# Patient Record
Sex: MALE | Race: WHITE | NOT HISPANIC OR LATINO | Employment: FULL TIME | URBAN - METROPOLITAN AREA
[De-identification: names, ages, dates, MRNs, and addresses within clinical notes are randomized per-mention and may not be internally consistent; named-entity substitution may affect disease eponyms.]

---

## 2017-02-19 ENCOUNTER — HOSPITAL ENCOUNTER (EMERGENCY)
Facility: HOSPITAL | Age: 32
Discharge: HOME | End: 2017-02-19
Attending: EMERGENCY MEDICINE | Admitting: EMERGENCY MEDICINE
Payer: COMMERCIAL

## 2017-02-19 VITALS
RESPIRATION RATE: 16 BRPM | WEIGHT: 160 LBS | OXYGEN SATURATION: 99 % | HEIGHT: 71 IN | TEMPERATURE: 96.9 F | BODY MASS INDEX: 22.4 KG/M2 | DIASTOLIC BLOOD PRESSURE: 78 MMHG | HEART RATE: 67 BPM | SYSTOLIC BLOOD PRESSURE: 132 MMHG

## 2017-02-19 DIAGNOSIS — S05.01XA RIGHT CORNEAL ABRASION, INITIAL ENCOUNTER: Primary | ICD-10-CM

## 2017-02-19 PROCEDURE — 99283 EMERGENCY DEPT VISIT LOW MDM: CPT

## 2017-02-19 RX ORDER — PROPARACAINE HYDROCHLORIDE 5 MG/ML
1 SOLUTION/ DROPS OPHTHALMIC ONCE
Status: COMPLETED | OUTPATIENT
Start: 2017-02-19 | End: 2017-02-19

## 2017-02-19 RX ORDER — ERYTHROMYCIN 5 MG/G
0.5 OINTMENT OPHTHALMIC ONCE
Status: COMPLETED | OUTPATIENT
Start: 2017-02-19 | End: 2017-02-19

## 2017-02-19 RX ORDER — IBUPROFEN 600 MG/1
600 TABLET ORAL ONCE
Status: COMPLETED | OUTPATIENT
Start: 2017-02-19 | End: 2017-02-19

## 2017-02-19 RX ADMIN — FLUORESCEIN SODIUM 1 STRIP: 1 STRIP OPHTHALMIC at 19:00

## 2017-02-19 RX ADMIN — IBUPROFEN 600 MG: 600 TABLET ORAL at 19:23

## 2017-02-19 RX ADMIN — ERYTHROMYCIN 0.5 INCH: 5 OINTMENT OPHTHALMIC at 19:24

## 2017-02-19 RX ADMIN — PROPARACAINE HYDROCHLORIDE 1 DROP: 5 SOLUTION/ DROPS OPHTHALMIC at 19:00

## 2021-06-12 ENCOUNTER — APPOINTMENT (EMERGENCY)
Dept: RADIOLOGY | Facility: HOSPITAL | Age: 36
End: 2021-06-12
Payer: OTHER MISCELLANEOUS

## 2021-06-12 ENCOUNTER — OCCMED (OUTPATIENT)
Dept: URGENT CARE | Age: 36
End: 2021-06-12
Payer: OTHER MISCELLANEOUS

## 2021-06-12 ENCOUNTER — HOSPITAL ENCOUNTER (EMERGENCY)
Facility: HOSPITAL | Age: 36
Discharge: HOME | End: 2021-06-12
Attending: EMERGENCY MEDICINE | Admitting: EMERGENCY MEDICINE
Payer: OTHER MISCELLANEOUS

## 2021-06-12 VITALS
OXYGEN SATURATION: 98 % | DIASTOLIC BLOOD PRESSURE: 87 MMHG | TEMPERATURE: 98 F | HEART RATE: 76 BPM | RESPIRATION RATE: 18 BRPM | SYSTOLIC BLOOD PRESSURE: 142 MMHG

## 2021-06-12 DIAGNOSIS — S01.01XA LACERATION OF SCALP, INITIAL ENCOUNTER: ICD-10-CM

## 2021-06-12 DIAGNOSIS — M54.2 CERVICAL SPINE PAIN: ICD-10-CM

## 2021-06-12 DIAGNOSIS — S09.90XA CLOSED HEAD INJURY, INITIAL ENCOUNTER: ICD-10-CM

## 2021-06-12 DIAGNOSIS — S01.01XA SCALP LACERATION, INITIAL ENCOUNTER: Primary | ICD-10-CM

## 2021-06-12 DIAGNOSIS — M54.2 NECK PAIN: Primary | ICD-10-CM

## 2021-06-12 PROCEDURE — 90715 TDAP VACCINE 7 YRS/> IM: CPT | Performed by: EMERGENCY MEDICINE

## 2021-06-12 PROCEDURE — 99283 EMERGENCY DEPT VISIT LOW MDM: CPT

## 2021-06-12 PROCEDURE — 72125 CT NECK SPINE W/O DYE: CPT

## 2021-06-12 PROCEDURE — G0384 LEV 5 HOSP TYPE B ED VISIT: HCPCS | Performed by: NURSE PRACTITIONER

## 2021-06-12 PROCEDURE — 99284 EMERGENCY DEPT VISIT MOD MDM: CPT | Performed by: EMERGENCY MEDICINE

## 2021-06-12 PROCEDURE — 99285 EMERGENCY DEPT VISIT HI MDM: CPT | Performed by: NURSE PRACTITIONER

## 2021-06-12 PROCEDURE — 90471 IMMUNIZATION ADMIN: CPT

## 2021-06-12 PROCEDURE — 70450 CT HEAD/BRAIN W/O DYE: CPT

## 2021-06-12 RX ORDER — NAPROXEN 500 MG/1
500 TABLET ORAL 2 TIMES DAILY WITH MEALS
Qty: 30 TABLET | Refills: 0 | Status: SHIPPED | OUTPATIENT
Start: 2021-06-12 | End: 2021-09-13

## 2021-06-12 RX ORDER — LIDOCAINE 50 MG/G
1 PATCH TOPICAL DAILY
Qty: 15 PATCH | Refills: 0 | Status: SHIPPED | OUTPATIENT
Start: 2021-06-12 | End: 2021-09-13

## 2021-06-12 RX ORDER — LIDOCAINE 50 MG/G
1 PATCH TOPICAL ONCE
Status: DISCONTINUED | OUTPATIENT
Start: 2021-06-12 | End: 2021-06-12

## 2021-06-12 RX ORDER — KETOROLAC TROMETHAMINE 30 MG/ML
15 INJECTION, SOLUTION INTRAMUSCULAR; INTRAVENOUS ONCE
Status: DISCONTINUED | OUTPATIENT
Start: 2021-06-12 | End: 2021-06-12

## 2021-06-12 RX ADMIN — TETANUS TOXOID, REDUCED DIPHTHERIA TOXOID AND ACELLULAR PERTUSSIS VACCINE, ADSORBED 0.5 ML: 5; 2.5; 8; 8; 2.5 SUSPENSION INTRAMUSCULAR at 10:56

## 2021-06-12 NOTE — ED PROVIDER NOTES
History  Chief Complaint   Patient presents with    Head Laceration     HPI  Patient is a 31-year-old male no significant past medical history presenting for evaluation after head trauma  Patient states that about 90 minutes prior to arrival in the emergency department he was attempting to install a gate, estimates that this weighs approximately 40 lb, lost control of it and it struck him in the head  Patient states that he was momentarily stunned but did not lose consciousness  Patient complains of burning pain in the area of the occiput foot where it struck him and where he has a small laceration  Patient additionally complaining of midline neck pain in the middle of the cervical spine  Patient denies focal weakness or numbness in his arms, legs, confusion, headache other than the superficial burning pain previously noted, nausea, vomiting, return vision changes  Patient does not know when he had his last tetanus shot  None       History reviewed  No pertinent past medical history  Past Surgical History:   Procedure Laterality Date    APPENDECTOMY      ROTATOR CUFF REPAIR         History reviewed  No pertinent family history  I have reviewed and agree with the history as documented  E-Cigarette/Vaping     E-Cigarette/Vaping Substances     Social History     Tobacco Use    Smoking status: Current Every Day Smoker     Packs/day: 1 00     Types: Cigarettes    Smokeless tobacco: Never Used   Substance Use Topics    Alcohol use: No    Drug use: No        Review of Systems   Constitutional: Negative for chills, fatigue and fever  HENT: Negative for congestion and rhinorrhea  Eyes: Negative for photophobia and visual disturbance  Respiratory: Negative for chest tightness and shortness of breath  Cardiovascular: Negative for chest pain, palpitations and leg swelling  Gastrointestinal: Negative for abdominal distention, abdominal pain, diarrhea, nausea and vomiting     Musculoskeletal: Positive for neck pain and neck stiffness  Negative for arthralgias, back pain, gait problem and myalgias  Skin: Negative for color change, pallor, rash and wound  Neurological: Positive for headaches  Negative for weakness and numbness  Psychiatric/Behavioral: Negative for confusion  All other systems reviewed and are negative  Physical Exam  ED Triage Vitals [06/12/21 1046]   Temperature Pulse Respirations Blood Pressure SpO2   98 °F (36 7 °C) 76 18 142/87 98 %      Temp Source Heart Rate Source Patient Position - Orthostatic VS BP Location FiO2 (%)   Oral Monitor -- -- --      Pain Score       --             Orthostatic Vital Signs  Vitals:    06/12/21 1046   BP: 142/87   Pulse: 76       Physical Exam  Vitals and nursing note reviewed  Constitutional:       General: He is not in acute distress  Appearance: He is well-developed  He is not diaphoretic  Comments: Well-appearing, nondistressed   HENT:      Head: Normocephalic and atraumatic  Comments: Roughly     Right Ear: External ear normal       Left Ear: External ear normal       Nose: Nose normal       Mouth/Throat:      Pharynx: No oropharyngeal exudate  Eyes:      Conjunctiva/sclera: Conjunctivae normal       Pupils: Pupils are equal, round, and reactive to light  Cardiovascular:      Rate and Rhythm: Normal rate and regular rhythm  Heart sounds: Normal heart sounds  No murmur heard  No friction rub  No gallop  Pulmonary:      Effort: Pulmonary effort is normal  No respiratory distress  Breath sounds: Normal breath sounds  No wheezing  Chest:      Chest wall: No tenderness  Abdominal:      General: Bowel sounds are normal  There is no distension  Palpations: Abdomen is soft  There is no mass  Tenderness: There is no abdominal tenderness  There is no guarding or rebound  Musculoskeletal:         General: No deformity  Comments: Patient in cervical collar   Point tenderness around C5 without stepoff or deformity  Skin:     General: Skin is warm and dry  Capillary Refill: Capillary refill takes less than 2 seconds  Comments: Small scalp abrasion not actively bleeding not requring closure    Neurological:      Mental Status: He is alert and oriented to person, place, and time  Comments: CN 2-12 intact, full strength and sensation bilaterally in upper and lower extremities   Psychiatric:         Behavior: Behavior normal          ED Medications  Medications   tetanus-diphtheria-acellular pertussis (BOOSTRIX) IM injection 0 5 mL (0 5 mL Intramuscular Given 6/12/21 1056)       Diagnostic Studies  Results Reviewed     None                 CT head without contrast   Final Result by Cheyenne Aaron DO (06/12 1212)   No acute intracranial abnormality  Workstation performed: [id number]         CT spine cervical without contrast   Final Result by Cheyenne Aaron DO (06/12 1214)   Degenerative changes cervical spine, most pronounced C5-C6  No acute cervical spine fracture or traumatic malalignment  Workstation performed: [id number]               Procedures  Procedures      ED Course                                       MDM  Number of Diagnoses or Management Options  Closed head injury, initial encounter  Laceration of scalp, initial encounter  Neck pain  Diagnosis management comments: 37 M with neck pain and small scalp laceration after axial loading injury, non-focal neuro exam, tetanus updated, CT head and C spine demonstrating degenerative changes at C5 but no acute fracture or dislocation, cervical collar cleared, abrasion not requiring closure, discharged with verbal and written return precautions        Disposition  Final diagnoses:   Neck pain   Laceration of scalp, initial encounter   Closed head injury, initial encounter     Time reflects when diagnosis was documented in both MDM as applicable and the Disposition within this note     Time User Action Codes Description Comment    6/12/2021 12:18 PM Ethel Shipman Add [M54 2] Neck pain     6/12/2021 12:21 PM Ethel Shipman Add [S01 01XA] Laceration of scalp, initial encounter     6/12/2021 12:24 PM Ethel Shipman Add [S09 90XA] Closed head injury, initial encounter       ED Disposition     ED Disposition Condition Date/Time Comment    Discharge Stable Sat Jun 12, 2021 12:21 PM Robert Calzada discharge to home/self care  Follow-up Information     Follow up With Specialties Details Why Contact Info Additional [address]  [facility]   [address] [phone]  [facility] [address] [facility]          Discharge Medication List as of 6/12/2021 12:26 PM      START taking these medications    Details   lidocaine (LIDODERM) 5 % Apply 1 patch topically daily Remove & Discard patch within 12 hours or as directed by MD, Starting Sat 6/12/2021, Print      naproxen (NAPROSYN) 500 mg tablet Take 1 tablet (500 mg total) by mouth 2 (two) times a day with meals for 15 days, Starting Sat 6/12/2021, Until Sun 6/27/2021, Print           No discharge procedures on file  PDMP Review     None           ED Provider  Attending physically available and evaluated Robert Calzada I managed the patient along with the ED Attending      Electronically Signed by         Bibi Ratliff MD  06/13/21 8739

## 2021-06-12 NOTE — ED ATTENDING ATTESTATION
Elisa Sahu DO, saw and evaluated the patient  I have discussed the patient with the resident/non-physician practitioner and agree with the resident's/non-physician practitioner's findings, Plan of Care, and MDM as documented in the resident's/non-physician practitioner's note, except where noted  All available labs and Radiology studies were reviewed  At this point I agree with the current assessment done in the Emergency Department  I have conducted an independent evaluation of this patient including a focused history and a physical exam     ED Note - Tuan Mann 37 y o  male MRN: [id number]  Unit/Bed#: ED 15 Encounter: [id number]    History of Present Illness   HPI  Tuan Mann is a 37 y o  male who presents for evaluation of closed head injury  Patient said he was installing a 40 pound gait door when it fell on his head and axial loading fashion  Patient complaining of midline neck pain  No focal neurological deficits  Small laceration to the occipital region  No other complaints  REVIEW OF SYSTEMS  See HPI for further details  12 systems reviewed and otherwise negative except as noted  Historical Information     PAST MEDICAL HISTORY  History reviewed  No pertinent past medical history  FAMILY HISTORY  History reviewed  No pertinent family history      SOCIAL HISTORY  Social History     Socioeconomic History    Marital status: Single     Spouse name: None    Number of children: None    Years of education: None    Highest education level: None   Occupational History    None   Social Needs    Financial resource strain: None    Food insecurity     Worry: None     Inability: None    Transportation needs     Medical: None     Non-medical: None   Tobacco Use    Smoking status: Current Every Day Smoker     Packs/day: 1 00     Types: Cigarettes    Smokeless tobacco: Never Used   Substance and Sexual Activity    Alcohol use: No    Drug use: No    Sexual activity: None Lifestyle    Physical activity     Days per week: None     Minutes per session: None    Stress: None   Relationships    Social connections     Talks on phone: None     Gets together: None     Attends Religion service: None     Active member of club or organization: None     Attends meetings of clubs or organizations: None     Relationship status: None    Intimate partner violence     Fear of current or ex partner: None     Emotionally abused: None     Physically abused: None     Forced sexual activity: None   Other Topics Concern    None   Social History Narrative    None       SURGICAL HISTORY  Past Surgical History:   Procedure Laterality Date    APPENDECTOMY      ROTATOR CUFF REPAIR       Meds/Allergies     CURRENT MEDICATIONS    Current Facility-Administered Medications:     tetanus-diphtheria-acellular pertussis (BOOSTRIX) IM injection 0 5 mL, 0 5 mL, Intramuscular, Once, Lyn Cox,     tetanus-diphtheria-acellular pertussis (BOOSTRIX) IM injection 0 5 mL, 0 5 mL, Intramuscular, Once, Krishna Shepherd MD  No current outpatient medications on file  (Not in a hospital admission)      ALLERGIES  Allergies   Allergen Reactions    Amoxicillin Hives    Penicillins Hives     Objective     PHYSICAL EXAM    VITAL SIGNS: Blood pressure 142/87, pulse 76, temperature 98 °F (36 7 °C), temperature source Oral, resp  rate 18, SpO2 98 %      Constitutional:  Appears well developed and well nourished, no acute distress, non-toxic appearance   Eyes:  PERRL, EOMI, conjunctivae pink, sclerae non-icteric    HENT:  Normocephalic, small approximately 1 centimeter laceration noted to the midline occipital region, no rhinorrhea, mucous membranes moist   Neck:  Cervical collar in place, midline tenderness without deformity or step-off   Respiratory:  No respiratory distress, normal breath sounds   Cardiovascular:  Normal rate, normal rhythm    GI:  Soft, no tenderness, non-distended  :  No CVAT, no flank ecchymosis  Musculoskeletal:  No swelling or edema, no tenderness, no deformities  Integument:  Pink, warm, dry, Well hydrated, no rash, no erythema, no bullae   Lymphatic:  No cervical/ tonsillar/ submandibular lymphadenopathy noted   Neurologic:  Awake, Alert & oriented x 3, CN 2-12 intact, no focal neurological deficits, motor function intact  Psychiatric:  Speech and behavior appropriate       ED COURSE and MDM:    Assessment/Plan   Assessment:  Dickson Rowland is a 37 y o  male presents for evaluation of closed head injury, neck pain after 40 pound gait or fell on head  Plan:  CT head, CT cervical spine, symptom management, laceration repair, Tdap booster, disposition as appropriate  CRITICAL CARE TIME:   0      Portions of the record may have been created with voice recognition software  Occasional wrong word or "sound a like" substitutions may have occurred due to the inherent limitations of voice recognition software       ED Provider  Electronically Signed by

## 2021-06-12 NOTE — DISCHARGE INSTRUCTIONS
Continue local wound care  You will need to follow up in 7-10 days for removal of staple  If you have any spreading redness, pain, fevers, chills, pus, or any signs of infection, return to the emergency department  If you develop weakness or numbness in your arms, confusion, if your neck pain becomes severe, return to the emergency department

## 2021-06-21 ENCOUNTER — APPOINTMENT (OUTPATIENT)
Dept: URGENT CARE | Age: 36
End: 2021-06-21
Payer: OTHER MISCELLANEOUS

## 2021-06-21 PROCEDURE — 99213 OFFICE O/P EST LOW 20 MIN: CPT | Performed by: PHYSICIAN ASSISTANT

## 2021-07-22 ENCOUNTER — APPOINTMENT (EMERGENCY)
Dept: RADIOLOGY | Facility: HOSPITAL | Age: 36
End: 2021-07-22
Payer: COMMERCIAL

## 2021-07-22 ENCOUNTER — HOSPITAL ENCOUNTER (EMERGENCY)
Facility: HOSPITAL | Age: 36
Discharge: HOME | End: 2021-07-22
Attending: EMERGENCY MEDICINE
Payer: COMMERCIAL

## 2021-07-22 VITALS
WEIGHT: 185 LBS | OXYGEN SATURATION: 98 % | BODY MASS INDEX: 25.8 KG/M2 | TEMPERATURE: 98 F | RESPIRATION RATE: 18 BRPM | HEART RATE: 84 BPM | SYSTOLIC BLOOD PRESSURE: 139 MMHG | DIASTOLIC BLOOD PRESSURE: 80 MMHG

## 2021-07-22 DIAGNOSIS — S62.339A BOXER'S FRACTURE: Primary | ICD-10-CM

## 2021-07-22 PROCEDURE — 99283 EMERGENCY DEPT VISIT LOW MDM: CPT

## 2021-07-22 PROCEDURE — 73130 X-RAY EXAM OF HAND: CPT

## 2021-07-22 PROCEDURE — 99284 EMERGENCY DEPT VISIT MOD MDM: CPT | Performed by: EMERGENCY MEDICINE

## 2021-07-22 NOTE — ED PROVIDER NOTES
History  Chief Complaint   Patient presents with    Hand Injury     rigth hand inj yesterday, +swelling      HPI  32 yo M presents with R hand injury  He states he was working on his truck when his felipe hit his hand yesterday  He has had pain that has been moderate in addition to swelling of hand  No other injuries  Prior to Admission Medications   Prescriptions Last Dose Informant Patient Reported? Taking?   lidocaine (LIDODERM) 5 %   No No   Sig: Apply 1 patch topically daily Remove & Discard patch within 12 hours or as directed by MD   naproxen (NAPROSYN) 500 mg tablet   No No   Sig: Take 1 tablet (500 mg total) by mouth 2 (two) times a day with meals for 15 days      Facility-Administered Medications: None       No past medical history on file  Past Surgical History:   Procedure Laterality Date    APPENDECTOMY      ROTATOR CUFF REPAIR         No family history on file  I have reviewed and agree with the history as documented  E-Cigarette/Vaping     E-Cigarette/Vaping Substances     Social History     Tobacco Use    Smoking status: Current Every Day Smoker     Packs/day: 1 00     Types: Cigarettes    Smokeless tobacco: Never Used   Substance Use Topics    Alcohol use: No    Drug use: No       Review of Systems   Constitutional: Negative for chills and fever  HENT: Negative for dental problem and ear pain  Eyes: Negative for pain and redness  Respiratory: Negative for cough and shortness of breath  Cardiovascular: Negative for chest pain and palpitations  Gastrointestinal: Negative for abdominal pain and nausea  Endocrine: Negative for polydipsia and polyphagia  Genitourinary: Negative for dysuria and frequency  Musculoskeletal: Positive for arthralgias  Negative for joint swelling  Skin: Negative for color change and rash  Neurological: Negative for dizziness and headaches  Psychiatric/Behavioral: Negative for behavioral problems and confusion     All other systems reviewed and are negative  Physical Exam  Physical Exam  Vitals and nursing note reviewed  Constitutional:       General: He is not in acute distress  HENT:      Head: Normocephalic and atraumatic  Right Ear: External ear normal       Left Ear: External ear normal       Nose: Nose normal    Eyes:      General: No scleral icterus  Cardiovascular:      Rate and Rhythm: Normal rate  Pulmonary:      Effort: Pulmonary effort is normal  No respiratory distress  Abdominal:      General: There is no distension  Musculoskeletal:         General: No deformity  Normal range of motion  Comments: R hand TTP ulnar aspect with edema, normal ROM, normal sensation and strength, radial pulse 2+   Skin:     Findings: No rash  Neurological:      General: No focal deficit present  Mental Status: He is alert        Gait: Gait normal    Psychiatric:         Mood and Affect: Mood normal          Vital Signs  ED Triage Vitals [07/22/21 1505]   Temperature Pulse Respirations Blood Pressure SpO2   98 °F (36 7 °C) 84 18 139/80 98 %      Temp Source Heart Rate Source Patient Position - Orthostatic VS BP Location FiO2 (%)   Temporal Monitor Sitting Left arm --      Pain Score       --           Vitals:    07/22/21 1505   BP: 139/80   Pulse: 84   Patient Position - Orthostatic VS: Sitting         Visual Acuity      ED Medications  Medications - No data to display    Diagnostic Studies  Results Reviewed     None                 XR hand 3+ views RIGHT   ED Interpretation by Mila Castillo MD (07/22 6513)   Boxers fracture                 Procedures  Splint application    Date/Time: 7/22/2021 4:12 PM  Performed by: Mila Castillo MD  Authorized by: Mila Castillo MD   Universal Protocol:  Consent given by: patient  Patient identity confirmed: verbally with patient      Pre-procedure details:     Sensation:  Normal  Procedure details:     Laterality:  Right    Location:  Hand    Hand:  R hand    Splint type:  Ulnar gutter Supplies:  Elastic bandage, Ortho-Glass and cotton padding             ED Course                                           MDM  Patient presents with R hand injury  Found to have boxers fracture on xray per my read  Have splinted and will refer to orthopedics for recheck  Disposition  Final diagnoses:   Boxer's fracture     Time reflects when diagnosis was documented in both MDM as applicable and the Disposition within this note     Time User Action Codes Description Comment    7/22/2021  3:40 PM Maru Davison [P43 062L] Boxer's fracture       ED Disposition     ED Disposition Condition Date/Time Comment    Discharge Stable Thu Jul 22, 2021  3:40 PM Lethea Medici discharge to home/self care  Follow-up Information     Follow up With Specialties Details Why Contact Info Additional [address] [facility] Surgery Call  for orthopedic follow up [address] [phone]  [address] [facility], [address] [address] [place], [place], [address] [facility]          Patient's Medications   Discharge Prescriptions    No medications on file     No discharge procedures on file      PDMP Review     None          ED Provider  Electronically Signed by           Alexa Amado MD  07/22/21 3612

## 2021-07-22 NOTE — Clinical Note
Mikhail Ellis was seen and treated in our emergency department on 7/22/2021  Diagnosis: boxers fracture    Person Alejandra  may return to work on return date  He may return on this date: 07/29/2021         If you have any questions or concerns, please don't hesitate to call        Robson Li MD    ______________________________           _______________          _______________  Hospital Representative                              Date                                Time

## 2021-07-26 ENCOUNTER — APPOINTMENT (OUTPATIENT)
Dept: RADIOLOGY | Facility: MEDICAL CENTER | Age: 36
End: 2021-07-26
Payer: COMMERCIAL

## 2021-07-26 VITALS
HEART RATE: 98 BPM | BODY MASS INDEX: 25.9 KG/M2 | WEIGHT: 185 LBS | DIASTOLIC BLOOD PRESSURE: 73 MMHG | SYSTOLIC BLOOD PRESSURE: 109 MMHG | HEIGHT: 71 IN

## 2021-07-26 DIAGNOSIS — M79.641 RIGHT HAND PAIN: ICD-10-CM

## 2021-07-26 DIAGNOSIS — S62.336A CLOSED DISPLACED FRACTURE OF NECK OF FIFTH METACARPAL BONE OF RIGHT HAND, INITIAL ENCOUNTER: ICD-10-CM

## 2021-07-26 DIAGNOSIS — M79.641 RIGHT HAND PAIN: Primary | ICD-10-CM

## 2021-07-26 PROCEDURE — 26600 TREAT METACARPAL FRACTURE: CPT | Performed by: ORTHOPAEDIC SURGERY

## 2021-07-26 PROCEDURE — 73130 X-RAY EXAM OF HAND: CPT

## 2021-07-26 PROCEDURE — 99204 OFFICE O/P NEW MOD 45 MIN: CPT | Performed by: ORTHOPAEDIC SURGERY

## 2021-07-26 NOTE — PROGRESS NOTES
CHIEF COMPLAINT:  Chief Complaint   Patient presents with    Right Hand - Pain       SUBJECTIVE:  Chad Pederson is a 31y o  year old male who presents right hand pain  Patient's injury occurred on 07/21/2021  He states he was working on his truck when a [place] it is hand  He had swelling and went to the emergency room  He had x-rays which demonstrated a 5th metacarpal neck fracture  He is placed into splint instructed to follow up with Orthopedics  Today he states he has pain over the 5th metacarpal   He denies any numbness or tingling  PAST MEDICAL HISTORY:  History reviewed  No pertinent past medical history  PAST SURGICAL HISTORY:  Past Surgical History:   Procedure Laterality Date    APPENDECTOMY      ROTATOR CUFF REPAIR         FAMILY HISTORY:  History reviewed  No pertinent family history      SOCIAL HISTORY:  Social History     Tobacco Use    Smoking status: Current Every Day Smoker     Packs/day: 1 00     Types: Cigarettes    Smokeless tobacco: Never Used   Substance Use Topics    Alcohol use: No    Drug use: No       MEDICATIONS:    Current Outpatient Medications:     lidocaine (LIDODERM) 5 %, Apply 1 patch topically daily Remove & Discard patch within 12 hours or as directed by MD (Patient not taking: Reported on 7/26/2021), Disp: 15 patch, Rfl: 0    naproxen (NAPROSYN) 500 mg tablet, Take 1 tablet (500 mg total) by mouth 2 (two) times a day with meals for 15 days, Disp: 30 tablet, Rfl: 0    ALLERGIES:  Allergies   Allergen Reactions    Amoxicillin Hives    Penicillins Hives       REVIEW OF SYSTEMS:  Review of Systems  ROS:   General: no fever, no chills  HEENT:  No loss of hearing or eyesight problems  Eyes:  No red eyes  Respiratory:  No coughing, shortness of breath or wheezing  Cardiovascular:  No chest pain, no palpitations  GI:  Abdomen soft nontender, denies nausea  Endocrine:  No muscle weakness, no frequent urination, no excessive thirst  Urinary:  No dysuria, no incontinence  Musculoskeletal: see HPI and PE  SKIN:  No skin rash, no dry skin  Neurological:  No headaches, no confusion  Psychiatric:  No suicide thoughts, no anxiety, no depression  Review of all other systems is negative    VITALS:  Vitals:    07/26/21 1102   BP: 109/73   Pulse: 98       LABS:  HgA1c: No results found for: HGBA1C  BMP: No results found for: GLUCOSE, CALCIUM, NA, K, CO2, CL, BUN, CREATININE    _____________________________________________________  PHYSICAL EXAMINATION:  General: well developed and well nourished, alert, oriented times 3 and appears comfortable  Psychiatric: Normal  HEENT: Trachea Midline, No torticollis  Pulmonary: No audible wheezing or strider  Cardiovascular: No discernable arrhythmia   Skin: No masses, erythema, lacerations, fluctation, ulcerations  Neurovascular: Sensation Intact to the Median, Ulnar, Radial Nerve, Motor Intact to the Median, Ulnar, Radial Nerve and Pulses Intact    MUSCULOSKELETAL EXAMINATION:  Right hand  Swelling and ecchymosis noted over the 5th metacarpal, no erythema noted   Tenderness to palpation over the 5th metacarpal   He has decreased range of motion due to pain and swelling    ___________________________________________________  STUDIES REVIEWED:  Images obtained today of the Right hand 3 views demonstrate Fracture of the 5th metacarpal neck noted      PROCEDURES PERFORMED:  Fracture / Dislocation Treatment    Date/Time: 7/26/2021 11:30 AM  Performed by: Clifton Monge MD  Authorized by: Clifton Monge MD     Patient Location:  Clinic  Verbal consent obtained?: Yes    Risks and benefits: Risks, benefits and alternatives were discussed    Consent given by:  Patient  Patient states understanding of procedure being performed: Yes    Patient's understanding of procedure matches consent: Yes    Site marked: Yes    Patient identity confirmed:  Verbally with patient  Injury location:  Hand  Location details:  Right hand  Injury type: Fracture  Fracture type: fifth metacarpal    Neurovascular status: Neurovascularly intact    Distal perfusion: normal    Neurological function: normal    Range of motion: normal    Local anesthesia used?: No    Immobilization:  Cast  Cast type: ulnar gutter   Supplies used:  Cotton padding and fiberglass  Neurovascular status: Neurovascularly intact    Distal perfusion: normal    Neurological function: normal    Range of motion: normal    Patient tolerance:  Patient tolerated the procedure well with no immediate complications           _____________________________________________________  ASSESSMENT/PLAN:    Right 5th metacarpal neck fracture  - patient was placed into a ulnar gutter cast today  He tolerated well     -he was given cast precautions   -he is given a work note for no use of the right upper extremity  -he will follow up in 3 weeks for re-evaluation with x-rays of the right hand and cast off  Follow Up:  Return in about 3 weeks (around 8/16/2021)  Work/school status:  No use of the right upper extremity    To Do Next Visit:  Re-evaluation of current issue, cast off x-rays right hand    General Discussions:  Fracture - Nonoperative Care: The physiology of a fractured bone was discussed with the patient today  With non-displaced or minimally displaced fractures, conservative treatment such as casting or splinting often results in a functional recovery  Typically, these fractures are immobilized in either a cast or splint depending on the pattern  Radiographs are typically taken at intervals throughout the fracture healing to ensure that reduction or alignment is not lost   If the fracture loses its alignment, surgical intervention may be required to stabilize it  Medical conditions such as diabetes, osteoporosis, vitamin D deficiency, and a history of or exposure to smoking may delay or prevent fracture healing   Options between cast/splint immobilization and surgical treatment were offered and the risks and benefits of both were discussed  Scribe Attestation    I,:  Brooklyn Roblero PA-C am acting as a scribe while in the presence of the attending physician :       I,:  Raegan Feliciano MD personally performed the services described in this documentation    as scribed in my presence :           Portions of the record may have been created with voice recognition software  Occasional wrong word or "sound a like" substitutions may have occurred due to the inherent limitations of voice recognition software  Read the chart carefully and recognize, using context, where substitutions have occurred

## 2021-08-07 ENCOUNTER — HOSPITAL ENCOUNTER (EMERGENCY)
Facility: HOSPITAL | Age: 36
Discharge: HOME | End: 2021-08-07
Attending: EMERGENCY MEDICINE | Admitting: EMERGENCY MEDICINE
Payer: COMMERCIAL

## 2021-08-07 ENCOUNTER — APPOINTMENT (EMERGENCY)
Dept: RADIOLOGY | Facility: HOSPITAL | Age: 36
End: 2021-08-07
Payer: COMMERCIAL

## 2021-08-07 VITALS
TEMPERATURE: 98.1 F | WEIGHT: 185 LBS | HEART RATE: 91 BPM | HEIGHT: 71 IN | OXYGEN SATURATION: 99 % | DIASTOLIC BLOOD PRESSURE: 87 MMHG | BODY MASS INDEX: 25.9 KG/M2 | SYSTOLIC BLOOD PRESSURE: 134 MMHG | RESPIRATION RATE: 18 BRPM

## 2021-08-07 DIAGNOSIS — S61.209A AVULSION OF FINGER, INITIAL ENCOUNTER: Primary | ICD-10-CM

## 2021-08-07 PROCEDURE — 99283 EMERGENCY DEPT VISIT LOW MDM: CPT

## 2021-08-07 PROCEDURE — 99282 EMERGENCY DEPT VISIT SF MDM: CPT | Performed by: EMERGENCY MEDICINE

## 2021-08-07 PROCEDURE — 73140 X-RAY EXAM OF FINGER(S): CPT

## 2021-08-07 RX ORDER — LIDOCAINE HYDROCHLORIDE AND EPINEPHRINE 20; 5 MG/ML; UG/ML
5 INJECTION, SOLUTION EPIDURAL; INFILTRATION; INTRACAUDAL; PERINEURAL ONCE
Status: COMPLETED | OUTPATIENT
Start: 2021-08-07 | End: 2021-08-07

## 2021-08-07 RX ORDER — OXYCODONE HYDROCHLORIDE 5 MG/1
5 TABLET ORAL ONCE
Status: COMPLETED | OUTPATIENT
Start: 2021-08-07 | End: 2021-08-07

## 2021-08-07 RX ORDER — LIDOCAINE HYDROCHLORIDE 20 MG/ML
5 INJECTION, SOLUTION EPIDURAL; INFILTRATION; INTRACAUDAL; PERINEURAL ONCE
Status: DISCONTINUED | OUTPATIENT
Start: 2021-08-07 | End: 2021-08-07

## 2021-08-07 RX ADMIN — LIDOCAINE HYDROCHLORIDE AND EPINEPHRINE 5 ML: 20; 5 INJECTION, SOLUTION EPIDURAL; INFILTRATION; INTRACAUDAL; PERINEURAL at 22:45

## 2021-08-07 RX ADMIN — OXYCODONE HYDROCHLORIDE 5 MG: 5 TABLET ORAL at 22:39

## 2021-08-08 NOTE — ED PROVIDER NOTES
Pt Name: Raghu Mercado  MRN: [id number]  [place] 1985  Age/Sex: 31 y o  male  Date of evaluation: 8/7/2021  PCP: No primary care provider on file  CHIEF COMPLAINT    Chief Complaint   Patient presents with    Finger Injury     Pt reports cutting his right ring finger on a mandaline  HPI    31 y o  male presenting with finger injury  Patient has a right forearm/hand cast on for boxer's fracture  He was cutting a cucumber with a mandoline and accidentally sliced his tip of 4th digit on right hand  He is right-handed  Up-to-date with tetanus  Injury occurred 45 minutes prior to my examination  Past Medical and Surgical History    History reviewed  No pertinent past medical history  Past Surgical History:   Procedure Laterality Date    APPENDECTOMY      ROTATOR CUFF REPAIR         History reviewed  No pertinent family history  Social History     Tobacco Use    Smoking status: Current Every Day Smoker     Packs/day: 1 00     Types: Cigarettes    Smokeless tobacco: Never Used   Substance Use Topics    Alcohol use: No    Drug use: No           Allergies    Allergies   Allergen Reactions    Amoxicillin Hives    Penicillins Hives       Home Medications    Prior to Admission medications    Medication Sig Start Date End Date Taking? Authorizing Provider   lidocaine (LIDODERM) 5 % Apply 1 patch topically daily Remove & Discard patch within 12 hours or as directed by MD  Patient not taking: Reported on 7/26/2021 6/12/21   Mary Lou Finney MD   naproxen (NAPROSYN) 500 mg tablet Take 1 tablet (500 mg total) by mouth 2 (two) times a day with meals for 15 days 6/12/21 6/27/21  Mary Lou Finney MD           Review of Systems    Review of Systems   Constitutional: Negative for chills and fever  Respiratory: Negative for cough and shortness of breath  Gastrointestinal: Negative for nausea and vomiting  Skin: Positive for wound  Negative for rash     Neurological: Negative for syncope and numbness  Physical Exam      ED Triage Vitals   Temperature Pulse Respirations Blood Pressure SpO2   08/07/21 2026 08/07/21 2026 08/07/21 2026 08/07/21 2026 08/07/21 2026   98 1 °F (36 7 °C) 91 18 134/87 99 %      Temp Source Heart Rate Source Patient Position - Orthostatic VS BP Location FiO2 (%)   08/07/21 2026 08/07/21 2026 08/07/21 2026 08/07/21 2026 --   Oral Monitor Sitting Left arm       Pain Score       08/07/21 2239       Worst Possible Pain               Physical Exam  Constitutional:       Appearance: He is not ill-appearing  HENT:      Head: Normocephalic and atraumatic  Nose: Nose normal       Mouth/Throat:      Mouth: Mucous membranes are moist    Eyes:      Extraocular Movements: Extraocular movements intact  Pupils: Pupils are equal, round, and reactive to light  Cardiovascular:      Rate and Rhythm: Normal rate and regular rhythm  Pulmonary:      Effort: Pulmonary effort is normal  No respiratory distress  Abdominal:      General: There is no distension  Musculoskeletal:      Cervical back: Normal range of motion  No rigidity  Comments: R arm cast in place  Skin:     General: Skin is warm  Comments: R 4th digit distal tip avulsion laceration, no involvement of nail, no visibile bone, no pulsatile bleeding however is oozing blood  Neurological:      Mental Status: He is alert and oriented to person, place, and time  Mental status is at baseline  Sensory: No sensory deficit  Diagnostic Results      Labs:    Results Reviewed     None          All labs reviewed and utilized in the medical decision making process    Radiology:    XR finger fourth digit-ring RIGHT    (Results Pending)       All radiology studies independently viewed by me and interpreted by the radiologist     Procedure    Procedures        MDM    Injected approximately 1 mL of lidocaine with epinephrine to achieve anesthesia, washed under tap water  Performing incremental injection withdrawing back 1st   No blood return  No nail involvement bleeding improved significantly with the lidocaine infiltration wound is not amenable to sutures given it is an avulsion  Discussed this with patient, he verbalized understanding  Placed petroleum jelly gauze, gauze wrap and tape  He sustained the injury with a brand-new mandoline, do not think he needs antibiotic prophylaxis at this time  Currently no concern for infection  He already is following with orthopedic hand surgery, advised to call his hand surgeon on Monday  Return precautions discussed  Medications   oxyCODONE (ROXICODONE) IR tablet 5 mg (5 mg Oral Given 8/7/21 2239)   lidocaine-epinephrine (XYLOCAINE-MPF/EPINEPHRINE) 2 %-1:200,000 injection 5 mL (5 mL Infiltration Given 8/7/21 2245)           FINAL IMPRESSION    Final diagnoses:   Avulsion of finger, initial encounter         DISPOSITION    Time reflects when diagnosis was documented in both MDM as applicable and the Disposition within this note     Time User Action Codes Description Comment    8/7/2021 10:55 PM Joy [email or web address] Avulsion of finger, initial encounter       ED Disposition     ED Disposition Condition Date/Time Comment    Discharge Stable Sat Aug 7, 2021 10:55 PM Precious Eduardo discharge to home/self care              Follow-up Information     Follow up With Specialties Details Why Contact Info    Yadi Caldwell MD Orthopedic Surgery, Hand Surgery, Orthopedics Call on 8/9/2021  [address] [facility] [address]  [facility] [address]  [phone]              PATIENT REFERRED TO:    Yadi Caldwell MD  [address] [facility] [address]  [address]  [phone]    Call on 8/9/2021        DISCHARGE MEDICATIONS:    Discharge Medication List as of 8/7/2021 10:55 PM      CONTINUE these medications which have NOT CHANGED    Details   lidocaine (LIDODERM) 5 % Apply 1 patch topically daily Remove & Discard patch within 12 hours or as directed by MD, Starting Sat 6/12/2021, Print      naproxen (NAPROSYN) 500 mg tablet Take 1 tablet (500 mg total) by mouth 2 (two) times a day with meals for 15 days, Starting Sat 6/12/2021, Until Sun 6/27/2021, Print             No discharge procedures on file  Tran Orozco DO        This note was partially completed using voice recognition technology, and was scanned for gross errors; however some errors may still exist  Please contact the author with any questions or requests for clarification        Tran Orozco DO  08/07/21 [id number]

## 2021-08-16 ENCOUNTER — APPOINTMENT (OUTPATIENT)
Dept: RADIOLOGY | Facility: MEDICAL CENTER | Age: 36
End: 2021-08-16
Payer: COMMERCIAL

## 2021-08-16 ENCOUNTER — OFFICE VISIT (OUTPATIENT)
Dept: OCCUPATIONAL THERAPY | Facility: MEDICAL CENTER | Age: 36
End: 2021-08-16
Payer: COMMERCIAL

## 2021-08-16 ENCOUNTER — OFFICE VISIT (OUTPATIENT)
Dept: OBGYN CLINIC | Facility: MEDICAL CENTER | Age: 36
End: 2021-08-16

## 2021-08-16 VITALS
BODY MASS INDEX: 25.93 KG/M2 | SYSTOLIC BLOOD PRESSURE: 135 MMHG | HEIGHT: 71 IN | HEART RATE: 96 BPM | WEIGHT: 185.2 LBS | DIASTOLIC BLOOD PRESSURE: 84 MMHG

## 2021-08-16 DIAGNOSIS — S62.336D CLOSED DISPLACED FRACTURE OF NECK OF FIFTH METACARPAL BONE OF RIGHT HAND WITH ROUTINE HEALING, SUBSEQUENT ENCOUNTER: ICD-10-CM

## 2021-08-16 DIAGNOSIS — S62.336D CLOSED DISPLACED FRACTURE OF NECK OF FIFTH METACARPAL BONE OF RIGHT HAND WITH ROUTINE HEALING, SUBSEQUENT ENCOUNTER: Primary | ICD-10-CM

## 2021-08-16 DIAGNOSIS — S62.336A CLOSED DISPLACED FRACTURE OF NECK OF FIFTH METACARPAL BONE OF RIGHT HAND, INITIAL ENCOUNTER: ICD-10-CM

## 2021-08-16 DIAGNOSIS — S61.209A AVULSION OF FINGER TIP, INITIAL ENCOUNTER: ICD-10-CM

## 2021-08-16 PROCEDURE — L3808 WHFO, RIGID W/O JOINTS: HCPCS

## 2021-08-16 PROCEDURE — 73130 X-RAY EXAM OF HAND: CPT

## 2021-08-16 PROCEDURE — 99024 POSTOP FOLLOW-UP VISIT: CPT | Performed by: ORTHOPAEDIC SURGERY

## 2021-08-16 NOTE — LETTER
August 16, 2021     Patient: Mannie Davis   YOB: 1985   Date of Visit: 8/16/2021       To Whom it May Concern:    Jay Neal is under my professional care  He was seen in my office on 8/16/2021  He may return to light duty at work  Still no use of the right upper extremity  He will follow-up in 3 weeks for re-evaluation  If you have any questions or concerns, please don't hesitate to call           Sincerely,          Cesia Coleman MD        CC: No Recipients

## 2021-08-16 NOTE — PROGRESS NOTES
CHIEF COMPLAINT:  Chief Complaint   Patient presents with    Right Hand - Follow-up       SUBJECTIVE:  Neva Heimlich is a 31y o  year old male who presents for follow-up of right 5th metacarpal neck fracture sustained on 07/21/2021 while working on his truck when a felipe hit his hand  At his last visit on 07/26/2021 patient was placed into an ulnar gutter cast   Patient has been tolerating the cast well until he unfortunately was cutting a cucumber with a mandoline and accidentally sliced the tip of his ring finger on the casted hand  He was evaluated at the ED same day on 08/07/2021 where the wound was dressed and he was advised to follow up as scheduled with us today  He presents to the office for cast removal repeat x-rays  Patient offers no additional complaints this time  PAST MEDICAL HISTORY:  History reviewed  No pertinent past medical history  PAST SURGICAL HISTORY:  Past Surgical History:   Procedure Laterality Date    APPENDECTOMY      ROTATOR CUFF REPAIR         FAMILY HISTORY:  History reviewed  No pertinent family history  SOCIAL HISTORY:  Social History     Tobacco Use    Smoking status: Current Every Day Smoker     Packs/day: 1 00     Types: Cigarettes    Smokeless tobacco: Never Used   Vaping Use    Vaping Use: Never used   Substance Use Topics    Alcohol use: No    Drug use: No       MEDICATIONS:    Current Outpatient Medications:     lidocaine (LIDODERM) 5 %, Apply 1 patch topically daily Remove & Discard patch within 12 hours or as directed by MD (Patient not taking: Reported on 7/26/2021), Disp: 15 patch, Rfl: 0    naproxen (NAPROSYN) 500 mg tablet, Take 1 tablet (500 mg total) by mouth 2 (two) times a day with meals for 15 days, Disp: 30 tablet, Rfl: 0    ALLERGIES:  Allergies   Allergen Reactions    Amoxicillin Hives    Penicillins Hives       REVIEW OF SYSTEMS:  Review of Systems   Constitutional: Negative for appetite change and unexpected weight change     HENT: Negative for congestion and trouble swallowing  Eyes: Negative for visual disturbance  Respiratory: Negative for cough and shortness of breath  Cardiovascular: Negative for chest pain and palpitations  Gastrointestinal: Negative for nausea and vomiting  Endocrine: Negative for cold intolerance and heat intolerance  Musculoskeletal: Negative for gait problem and myalgias  Skin: Negative for rash  Neurological: Negative for numbness  VITALS:  Vitals:    08/16/21 1350   BP: 135/84   Pulse: 96       LABS:  HgA1c: No results found for: HGBA1C  BMP: No results found for: GLUCOSE, CALCIUM, NA, K, CO2, CL, BUN, CREATININE    _____________________________________________________  PHYSICAL EXAMINATION:  General: well developed and well nourished, alert, oriented times 3 and appears comfortable  Psychiatric: Normal  HEENT: Trachea Midline, No torticollis  Pulmonary: No audible wheezing or strider  Cardiovascular: No discernable arrhythmia   Skin: No Masses, No Erythema  Neurovascular: Sensation Intact to the Median, Ulnar, Radial Nerve, Motor Intact to the Median, Ulnar, Radial Nerve and Pulses Intact    MUSCULOSKELETAL EXAMINATION:  Right hand  Finger tip avulsion injury at the tip of the right finger  No erythema or active drainage   No damage to nail bed   Nontender over 5th metacarpal neck at fracture site  ROM limited secondary to stiffness   Sensation intact to light touch   Brisk capillary refill    ___________________________________________________  STUDIES REVIEWED:  Images obtained today of the right hand 3 views demonstrate maintained alignment of 5th metacarpal neck fracture with signs of interval healing  No acute fracture dislocations of the right ring finger        PROCEDURES PERFORMED:  No Procedures performed today    _____________________________________________________  ASSESSMENT/PLAN:    Right 5th metacarpal neck fracture, now 3 5 weeks out from injury  Finger tip avulsion injury of the right ring finger sustained on 8/7/2021  · Ulnar gutter cast removed today without complication  We will send him to OT for a custom forearm based ulnar gutter splint  He can remove the splint for hygiene and ROM only but should wear it most other times  · His right ring finger avulsion injury should continue to heal nicely over time  · Keep it clean and dry and monitor for any signs of infection  · He can return to light duty at work still with no use of the RUE  · Contact the office with any further questions or concerns prior to next follow-up  · Follow-up in 3 weeks  Follow Up:  No follow-ups on file      To Do Next Visit:  X-rays of the  right  hand    Scribe Attestation    I,:  Heather Beauchamp am acting as a scribe while in the presence of the attending physician :       I,:  Brynn Plata MD personally performed the services described in this documentation    as scribed in my presence :

## 2021-08-16 NOTE — PROGRESS NOTES
Orthosis    Diagnosis:   1  Closed displaced fracture of neck of fifth metacarpal bone of right hand with routine healing, subsequent encounter  Ambulatory referral to PT/OT hand therapy     Indication: Fracture    Location: Right  wrist, ring finger and small finger  Supplies: Custom Fit Orthotic  Orthosis type: Ulnar Gutter Forearm Based  Wearing Schedule: Remove for HEP  Describe Position: forearm based ulnar gutter wrist extended 20* inclusive of digits 4 and 5, MP flexed to patient tolerance and IPs free    Precautions: Fracture    Patient or Caregiver expresses understanding of wearing Schedule and Precautions? Yes  Patient or Caregiver able to don/doff orthotic independently? Yes    Written orders provided to patient? Yes  Orders Obtained: Written  Orders Obtained from: [facility]    Return for evaluation and treatment No    Patient verbalized and demonstrated understanding of education provided on splint wear and care, schedule, donning/doffing, monitoring of skin integrity and circulation, HEP via successful teachback and demonstration  Patient instructed to contact office with questions or concerns

## 2021-09-13 ENCOUNTER — APPOINTMENT (OUTPATIENT)
Dept: RADIOLOGY | Facility: MEDICAL CENTER | Age: 36
End: 2021-09-13
Payer: COMMERCIAL

## 2021-09-13 ENCOUNTER — OFFICE VISIT (OUTPATIENT)
Dept: OBGYN CLINIC | Facility: MEDICAL CENTER | Age: 36
End: 2021-09-13

## 2021-09-13 VITALS
SYSTOLIC BLOOD PRESSURE: 124 MMHG | HEIGHT: 71 IN | HEART RATE: 73 BPM | DIASTOLIC BLOOD PRESSURE: 78 MMHG | BODY MASS INDEX: 25.94 KG/M2 | WEIGHT: 185.3 LBS

## 2021-09-13 DIAGNOSIS — S62.336D CLOSED DISPLACED FRACTURE OF NECK OF FIFTH METACARPAL BONE OF RIGHT HAND WITH ROUTINE HEALING, SUBSEQUENT ENCOUNTER: ICD-10-CM

## 2021-09-13 DIAGNOSIS — S62.336D CLOSED DISPLACED FRACTURE OF NECK OF FIFTH METACARPAL BONE OF RIGHT HAND WITH ROUTINE HEALING, SUBSEQUENT ENCOUNTER: Primary | ICD-10-CM

## 2021-09-13 PROCEDURE — 73130 X-RAY EXAM OF HAND: CPT

## 2021-09-13 PROCEDURE — 99024 POSTOP FOLLOW-UP VISIT: CPT | Performed by: PHYSICIAN ASSISTANT

## 2021-09-13 NOTE — LETTER
September 13, 2021     Patient: Koki Vale   YOB: 1985   Date of Visit: 9/13/2021       To Whom it May Concern:    Arnulfo Valencia is under my professional care  He was seen in my office on 9/13/2021  He can return to work without restrictions  No follow up is needed  If you have any questions or concerns, please don't hesitate to call  Sincerely,          Junior Fannie Bauer PA-C        CC: No Recipients

## 2021-09-14 NOTE — PROGRESS NOTES
CHIEF COMPLAINT:  Chief Complaint   Patient presents with    Right Hand - Follow-up       SUBJECTIVE:  Ly Pop is a 31y o  year old male who presents for follow-up of right 5th metacarpal neck fracture sustained on 07/21/2021 while working on his truck when a felipe hit his hand  Patient states that he has been wearing his ulnar gutter splint while at home  He presents to the office today without his splint  He states that he has no pain or discomfort at this time  He states he has been working on his range of motion at home when coming out of the splint  He denies any numbness or tingling  PAST MEDICAL HISTORY:  History reviewed  No pertinent past medical history  PAST SURGICAL HISTORY:  Past Surgical History:   Procedure Laterality Date    APPENDECTOMY      ROTATOR CUFF REPAIR         FAMILY HISTORY:  History reviewed  No pertinent family history  SOCIAL HISTORY:  Social History     Tobacco Use    Smoking status: Current Every Day Smoker     Packs/day: 1 00     Types: Cigarettes    Smokeless tobacco: Never Used   Vaping Use    Vaping Use: Never used   Substance Use Topics    Alcohol use: No    Drug use: No       MEDICATIONS:  No current outpatient medications on file  ALLERGIES:  Allergies   Allergen Reactions    Amoxicillin Hives    Penicillins Hives       REVIEW OF SYSTEMS:  Review of Systems   Constitutional: Negative for appetite change and unexpected weight change  HENT: Negative for congestion and trouble swallowing  Eyes: Negative for visual disturbance  Respiratory: Negative for cough and shortness of breath  Cardiovascular: Negative for chest pain and palpitations  Gastrointestinal: Negative for nausea and vomiting  Endocrine: Negative for cold intolerance and heat intolerance  Musculoskeletal: Negative for gait problem and myalgias  Skin: Negative for rash  Neurological: Negative for numbness         VITALS:  Vitals:    09/13/21 1333   BP: 124/78 Pulse: 73       LABS:  HgA1c: No results found for: HGBA1C  BMP: No results found for: GLUCOSE, CALCIUM, NA, K, CO2, CL, BUN, CREATININE    _____________________________________________________  PHYSICAL EXAMINATION:  General: well developed and well nourished, alert, oriented times 3 and appears comfortable  Psychiatric: Normal  HEENT: Trachea Midline, No torticollis  Pulmonary: No audible wheezing or strider  Cardiovascular: No discernable arrhythmia   Skin: No Masses, No Erythema  Neurovascular: Sensation Intact to the Median, Ulnar, Radial Nerve, Motor Intact to the Median, Ulnar, Radial Nerve and Pulses Intact    MUSCULOSKELETAL EXAMINATION:  Right hand  No erythema edema or ecchymosis noted, skin is warm to touch   Finger tip is well-healed with no signs or symptoms of infection   No tenderness to palpation over the fracture site  Fracture stable with testing   No pain with axial loading  Patient is able to make a full composite fist    ___________________________________________________  STUDIES REVIEWED:  Images obtained today of the right hand 3 views demonstrate maintained alignment of 5th metacarpal neck fracture with signs of interval healing  No acute fracture dislocations of the right ring finger  PROCEDURES PERFORMED:  No Procedures performed today    _____________________________________________________  ASSESSMENT/PLAN:    Right 5th metacarpal neck fracture  Finger tip avulsion injury of the right ring finger sustained on 8/7/2021  · Patient was advised that he can use his hand as tolerated for his activities of daily living   · He was advised to slowly discontinue the splint at this time   · He was encouraged to work on a home exercise program for range of motion strengthening   · He will follow up with us as needed        Follow Up:  Return if symptoms worsen or fail to improve      To Do Next Visit:  X-rays of the  right  hand

## 2022-07-18 NOTE — LETTER
July 26, 2021     Patient: Precious Eduardo   YOB: 1985   Date of Visit: 7/26/2021       To Whom it May Concern:    Gilda Good is under my professional care  He was seen in my office on 7/26/2021  He will have no use of the right upper extremity  He will follow up in 3 weeks for re-evaluation  If you have any questions or concerns, please don't hesitate to call           Sincerely,          Yadi Caldwell MD        CC: No Recipients
No

## 2023-11-20 ENCOUNTER — OFFICE VISIT (OUTPATIENT)
Dept: URGENT CARE | Facility: CLINIC | Age: 38
End: 2023-11-20
Payer: COMMERCIAL

## 2023-11-20 VITALS
DIASTOLIC BLOOD PRESSURE: 78 MMHG | RESPIRATION RATE: 18 BRPM | WEIGHT: 185 LBS | HEIGHT: 71 IN | BODY MASS INDEX: 25.9 KG/M2 | TEMPERATURE: 98.6 F | HEART RATE: 95 BPM | OXYGEN SATURATION: 100 % | SYSTOLIC BLOOD PRESSURE: 146 MMHG

## 2023-11-20 DIAGNOSIS — M54.50 ACUTE MIDLINE LOW BACK PAIN WITHOUT SCIATICA: Primary | ICD-10-CM

## 2023-11-20 PROCEDURE — 99213 OFFICE O/P EST LOW 20 MIN: CPT | Performed by: PHYSICIAN ASSISTANT

## 2023-11-20 PROCEDURE — 96372 THER/PROPH/DIAG INJ SC/IM: CPT | Performed by: PHYSICIAN ASSISTANT

## 2023-11-20 RX ORDER — CYCLOBENZAPRINE HCL 10 MG
10 TABLET ORAL 3 TIMES DAILY PRN
Qty: 45 TABLET | Refills: 0 | Status: SHIPPED | OUTPATIENT
Start: 2023-11-20

## 2023-11-20 RX ORDER — IBUPROFEN 400 MG/1
TABLET ORAL EVERY 6 HOURS PRN
COMMUNITY

## 2023-11-20 RX ORDER — PREDNISONE 10 MG/1
TABLET ORAL
Qty: 30 TABLET | Refills: 0 | Status: SHIPPED | OUTPATIENT
Start: 2023-11-20 | End: 2023-11-30

## 2023-11-20 RX ORDER — KETOROLAC TROMETHAMINE 30 MG/ML
60 INJECTION, SOLUTION INTRAMUSCULAR; INTRAVENOUS ONCE
Status: COMPLETED | OUTPATIENT
Start: 2023-11-20 | End: 2023-11-20

## 2023-11-20 RX ADMIN — KETOROLAC TROMETHAMINE 60 MG: 30 INJECTION, SOLUTION INTRAMUSCULAR; INTRAVENOUS at 16:13

## 2023-11-20 NOTE — PROGRESS NOTES
[facility]        NAME: Diego Spring is a 41 y.o. male  : 1985    MRN: [id number]  DATE: 2023  TIME: 4:14 PM    Assessment and Plan   Acute midline low back pain without sciatica [M54.50]  1. Acute midline low back pain without sciatica  ketorolac (TORADOL) 60 mg/2 mL IM injection 60 mg    cyclobenzaprine (FLEXERIL) 10 mg tablet    predniSONE 10 mg tablet        Patient Instructions     Take medicine as prescribed  1000mg tylenol [id number]  F/u with spine if sxs worsen or persist  Follow up with PCP in 3-5 days. Proceed to  ER if symptoms worsen. Chief Complaint     Chief Complaint   Patient presents with    Back Pain     Moved box of tile yesterday- felt pop and feels like he is having a spasm in his back if he moves the wrong way; tightness; tried ice/heat at home          History of Present Illness       Back Pain  This is a new problem. The problem occurs constantly. The problem has been gradually worsening since onset. The pain is present in the lumbar spine. The quality of the pain is described as stabbing. The pain does not radiate. The pain is moderate. Pertinent negatives include no bladder incontinence, bowel incontinence, chest pain, fever, paresis, paresthesias or tingling. He has tried ice, NSAIDs, analgesics and heat for the symptoms. Review of Systems   Review of Systems   Constitutional:  Negative for chills, diaphoresis, fatigue and fever. Respiratory:  Negative for cough, chest tightness, shortness of breath and wheezing. Cardiovascular:  Negative for chest pain. Gastrointestinal:  Negative for bowel incontinence. Genitourinary:  Negative for bladder incontinence. Musculoskeletal:  Positive for back pain, gait problem and myalgias. Negative for joint swelling, neck pain and neck stiffness. Skin:  Negative for rash. Neurological:  Negative for tingling and paresthesias.      Current Medications       Current Outpatient Medications: cyclobenzaprine (FLEXERIL) 10 mg tablet, Take 1 tablet (10 mg total) by mouth 3 (three) times a day as needed for muscle spasms, Disp: 45 tablet, Rfl: 0    ibuprofen (MOTRIN) 400 mg tablet, Take by mouth every 6 (six) hours as needed for mild pain, Disp: , Rfl:     predniSONE 10 mg tablet, Take 5 tablets (50 mg total) by mouth daily for 2 days, THEN 4 tablets (40 mg total) daily for 2 days, THEN 3 tablets (30 mg total) daily for 2 days, THEN 2 tablets (20 mg total) daily for 2 days, THEN 1 tablet (10 mg total) daily for 2 days. , Disp: 30 tablet, Rfl: 0    Current Facility-Administered Medications:     ketorolac (TORADOL) 60 mg/2 mL IM injection 60 mg, 60 mg, Intramuscular, Once, Suzette Otto PA-C    Current Allergies     Allergies as of 11/20/2023 - Reviewed 11/20/2023   Allergen Reaction Noted    Amoxicillin Hives 02/19/2017    Penicillins Hives 02/19/2017            The following portions of the patient's history were reviewed and updated as appropriate: allergies, current medications, past family history, past medical history, past social history, past surgical history and problem list.     History reviewed. No pertinent past medical history. Past Surgical History:   Procedure Laterality Date    APPENDECTOMY      ROTATOR CUFF REPAIR         History reviewed. No pertinent family history. Medications have been verified. Objective   /78   Pulse 95   Temp 98.6 °F (37 °C)   Resp 18   Ht 5' 11" (1.803 m)   Wt 83.9 kg (185 lb)   SpO2 100%   BMI 25.80 kg/m²   No LMP for male patient. Physical Exam     Physical Exam  Constitutional:       Appearance: He is well-developed. HENT:      Right Ear: Hearing normal.      Left Ear: Hearing normal.      Nose: No mucosal edema. Mouth/Throat: Tonsils: No tonsillar exudate. Cardiovascular:      Rate and Rhythm: Normal rate and regular rhythm. Heart sounds: Normal heart sounds. No murmur heard. No friction rub. No gallop. Pulmonary:      Effort: Pulmonary effort is normal. No respiratory distress. Breath sounds: Normal breath sounds. No stridor. No decreased breath sounds, wheezing, rhonchi or rales. Abdominal:      General: Bowel sounds are normal. There is no distension. Palpations: Abdomen is soft. There is no mass. Tenderness: There is no abdominal tenderness. There is no guarding or rebound. Musculoskeletal:      Lumbar back: Spasms (bilateral paraspinal muscles), tenderness and bony tenderness (superior lumbar) present. No swelling, edema, deformity, signs of trauma or lacerations. Decreased range of motion (2/2 pain). Positive right straight leg raise test and positive left straight leg raise test. No scoliosis. Lymphadenopathy:      Cervical: No cervical adenopathy. Right cervical: No superficial cervical adenopathy. Left cervical: No superficial cervical adenopathy.

## 2023-11-20 NOTE — LETTER
November 20, 2023     Patient: Lucas Lim   YOB: 1985   Date of Visit: 11/20/2023       To Whom It May Concern: It is my medical opinion that Mely Rodriguez should remain out of work until 11/22/23 due to injury . If you have any questions or concerns, please don't hesitate to call.          Sincerely,        Von Paul PA-C    CC: No Recipients

## 2023-11-27 ENCOUNTER — OFFICE VISIT (OUTPATIENT)
Dept: FAMILY MEDICINE CLINIC | Facility: CLINIC | Age: 38
End: 2023-11-27
Payer: COMMERCIAL

## 2023-11-27 VITALS
HEIGHT: 71 IN | HEART RATE: 93 BPM | OXYGEN SATURATION: 99 % | SYSTOLIC BLOOD PRESSURE: 145 MMHG | WEIGHT: 175.4 LBS | DIASTOLIC BLOOD PRESSURE: 82 MMHG | BODY MASS INDEX: 24.56 KG/M2

## 2023-11-27 DIAGNOSIS — Z11.59 NEED FOR HEPATITIS C SCREENING TEST: ICD-10-CM

## 2023-11-27 DIAGNOSIS — Z72.0 TOBACCO USE: ICD-10-CM

## 2023-11-27 DIAGNOSIS — S46.912A STRAIN OF LEFT SHOULDER, INITIAL ENCOUNTER: ICD-10-CM

## 2023-11-27 DIAGNOSIS — Z13.220 SCREENING, LIPID: ICD-10-CM

## 2023-11-27 DIAGNOSIS — Z11.4 SCREENING FOR HIV (HUMAN IMMUNODEFICIENCY VIRUS): ICD-10-CM

## 2023-11-27 DIAGNOSIS — M54.50 ACUTE MIDLINE LOW BACK PAIN WITHOUT SCIATICA: Primary | ICD-10-CM

## 2023-11-27 PROBLEM — M54.9 BACK PAIN: Status: ACTIVE | Noted: 2023-11-20

## 2023-11-27 PROCEDURE — 99204 OFFICE O/P NEW MOD 45 MIN: CPT | Performed by: INTERNAL MEDICINE

## 2023-11-27 NOTE — ASSESSMENT & PLAN NOTE
He may have a herniated disc. He does not have any evidence of neurological compromise. It does not appear to be compressing any in the nerves in his back. The prednisone has taken care of the acute inflammation. He will finish out the prednisone and then switch over to ibuprofen. He can use over-the-counter ibuprofen 600 to 800 mg up to 3 to 4 times a day with meals. Continue using Flexeril as needed. I will refer him to physical therapy for his back and left shoulder and see if there will be an improvement. Depending on his progress, his back should get better.

## 2023-11-27 NOTE — PROGRESS NOTES
Assessment/Plan:    Problem List Items Addressed This Visit        Other    Back pain - Primary     He may have a herniated disc. He does not have any evidence of neurological compromise. It does not appear to be compressing any in the nerves in his back. The prednisone has taken care of the acute inflammation. He will finish out the prednisone and then switch over to ibuprofen. He can use over-the-counter ibuprofen 600 to 800 mg up to 3 to 4 times a day with meals. Continue using Flexeril as needed. I will refer him to physical therapy for his back and left shoulder and see if there will be an improvement. Depending on his progress, his back should get better. Relevant Orders    Ambulatory referral to Physical Therapy    Tobacco use   Other Visit Diagnoses     Strain of left shoulder, initial encounter        Relevant Orders    Ambulatory referral to Physical Therapy    Need for hepatitis C screening test        Relevant Orders    Hepatitis C Antibody    Screening for HIV (human immunodeficiency virus)        Relevant Orders    HIV 1/2 AG/AB w Reflex [facility] for 2 yr old and above    Screening, lipid        Relevant Orders    Lipid panel        Left shoulder pain. If his shoulder is still a problem, we will need to schedule a follow-up with the orthopedic specialist to explore further interventions for his condition. Laboratory request.  Will order routine blood work for screening. Chief Complaint    Care Gap Request; New Patient Visit         HPI:    Radha Rojo is a 41 y.o. male who presents for a new patient visit. He is accompanied by his fiancée. He felt a "pop" in his midline lower lumbar region or L4 of the lumbar spine when he was bending down to  the boxes of floor tile. He visited urgent care on 11/20/2023 for acute midline low back pain and was given an injection of Toradol which helped and was also given cyclobenzaprine and a prednisone taper.  His pain has mostly resolved but he is tentative about lifting because he still has stiffness. Pain affects his activities of daily living as he is hesitant on performing heavy lifting as it might aggravate his lumbar pain. He had left shoulder arthroscopic surgery in 2011 for his rotator cuff tear. It has been bothering him again for a couple of months; he describes the pain as tight, and his hands feel "tingly" and feel like he has "pins and needle" on his hand. He dislocated his shoulder when he fell from the steps when he had a seizure and several other times before he went for the surgery. No medication is taken for his shoulder pain. 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 His fiancée notes that it was about 10 years since he took the medication. He has not had a seizure since 2014 as noted by his fiancée. A neurologist saw him but does not recall if they did a brain wave test. He was illicit drugs at the time of his seizures. He denies any chronic health problems such as high blood pressure, thyroid problems, diabetes, asthma, pneumonia, kidney stones, kidney infections, or migraines. He denies any shooting pains in his legs, any muscle weakness in his legs, and denies any problems holding onto his urine or bowel movements. He is not taking ibuprofen. He had grand mal seizures in his teens. He is taking prednisone taper and Flexeril 3 times a day as needed. He works as an  in [employer] in [facility]. He has not had any primary care physician in 15 years. He is a current smoker about a pack a day. He has been smoking for 20 years. His mother is alive. His father passed away 2 years ago from lung cancer. He has a younger brother who is slightly overweight and has depression after his father passed away.       Past Medical History:   Diagnosis Date   • Grand mal seizure [facility])     took Depakote for two years and couldn't get his medication while in prison. He had been using drugs at the time of the seizure. He hasn't had a seizure since 2014. Past Surgical History:   Procedure Laterality Date   • APPENDECTOMY     • ROTATOR CUFF REPAIR  2011        Family History   Problem Relation Age of Onset   • Lung cancer Father    • Depression Brother         Social History     Socioeconomic History   • Marital status: Single     Spouse name: Not on file   • Number of children: Not on file   • Years of education: Not on file   • Highest education level: Not on file   Occupational History   • Not on file   Tobacco Use   • Smoking status: Every Day     Packs/day: 1.00     Years: 20.00     Total pack years: 20.00     Types: Cigarettes   • Smokeless tobacco: Never   Vaping Use   • Vaping Use: Never used   Substance and Sexual Activity   • Alcohol use: No   • Drug use: Not Currently   • Sexual activity: Not on file   Other Topics Concern   • Not on file   Social History Narrative    Works at [employer] as an . Engaged.       Social Determinants of Health     Financial Resource Strain: Not on file   Food Insecurity: Not on file   Transportation Needs: Not on file   Physical Activity: Not on file   Stress: Not on file   Social Connections: Not on file   Intimate Partner Violence: Not on file   Housing Stability: Not on file        Current Outpatient Medications   Medication Sig Dispense Refill   • cyclobenzaprine (FLEXERIL) 10 mg tablet Take 1 tablet (10 mg total) by mouth 3 (three) times a day as needed for muscle spasms 45 tablet 0   • ibuprofen (MOTRIN) 400 mg tablet Take by mouth every 6 (six) hours as needed for mild pain     • predniSONE 10 mg tablet Take 5 tablets (50 mg total) by mouth daily for 2 days, THEN 4 tablets (40 mg total) daily for 2 days, THEN 3 tablets (30 mg total) daily for 2 days, THEN 2 tablets (20 mg total) daily for 2 days, THEN 1 tablet (10 mg total) daily for 2 days. 30 tablet 0     No current facility-administered medications for this visit. Allergies   Allergen Reactions   • Amoxicillin Hives   • Penicillins Hives       Review of Systems    /82 (BP Location: Right arm, Patient Position: Sitting, Cuff Size: Large)   Pulse 93   Ht 5' 11" (1.803 m)   Wt 79.6 kg (175 lb 6.4 oz)   SpO2 99%   BMI 24.46 kg/m²     General: Well-developed, well-nourished, in no acute distress. HEENT: Missing 2 molars on the right upper jaw, missing two molars bilaterally in the lower jaw. Cardiac: Regular rate and rhythm, no murmur, gallop, or rub. There is no JVD or HJR. Lungs: Clear to auscultation and percussion. Abdomen: Soft, nontender, normoactive bowel sounds, no palpable masses, no hepatosplenomegaly. Extremities: Well-healed 5 cm surgical scar on the left anterior shoulder. He can abduct to about 170 degrees. Anterior flexion is certainly limited to about 170 degrees. Posterior extension is normal. No clubbing, cyanosis, or edema. Musculoskeletal: No tenderness on palpation of the thoracic, lumbar, and sacral spine. There is a significant spasm of the erector spinae at the upper lumbar region on the right. Straight leg raise was negative bilaterally. Neurologic: Cranial nerves II-XII intact, motor was 5/5 in all major groups. Transcribed for Osito Jj MD, by Azra Vizcarra on 11/27/23 at 6:05 PM. Powered by [employer].

## 2023-11-28 ENCOUNTER — EVALUATION (OUTPATIENT)
Dept: PHYSICAL THERAPY | Facility: CLINIC | Age: 38
End: 2023-11-28

## 2023-11-28 DIAGNOSIS — M54.50 ACUTE MIDLINE LOW BACK PAIN WITHOUT SCIATICA: ICD-10-CM

## 2023-11-28 DIAGNOSIS — S46.912A STRAIN OF LEFT SHOULDER, INITIAL ENCOUNTER: ICD-10-CM

## 2023-11-28 PROCEDURE — 97161 PT EVAL LOW COMPLEX 20 MIN: CPT | Performed by: PHYSICAL THERAPIST

## 2023-11-28 NOTE — PROGRESS NOTES
PT Evaluation     Today's date: 2023  Patient name: Raciel Walker  : 1985  MRN: [id number]  Referring provider: Gerald Dacosta MD  Dx:   Encounter Diagnosis     ICD-10-CM    1. Acute midline low back pain without sciatica  M54.50 Ambulatory referral to Physical Therapy      2. Strain of left shoulder, initial encounter  B49.124E Ambulatory referral to Physical Therapy                     Assessment  Assessment details: Pt presents to PT with acute low back pain from lifting. He is limited with movement in all planes but mostly transverse and sagittal. No signs of active discal involvement but will continue to monitor. He fits into mobility category at this time and that tone is major . After education and TE today his forward bend improved to full hand below knee. Skilled PT warranted to address findings and progress towards outlined goals. Pt in agreement with current POC.       Symptom irritability: moderateUnderstanding of Dx/Px/POC: good   Prognosis: good    Goals  Stg: 3-4 visits  Patient will demonstrate 75% or greater improvement with typical adls  Educate patient on importance of movement and initial mobility program  Initiate low level trunk stability work    LT-6 weeks  Pain free, functional multi-seg mobility patterns in all planes  Demonstrate proper lifting technique for lumbar safety as part of physical job demands  No pain with resisted knee ext/flex on R  Independent with trunk strength program  Return to full work duty with minimal to no pain    - address shoulder as appropriate    Plan  Plan details: TE, NMR, TA, MT, self education, and modalities as needed in order to progress through skilled PT focused on  ROM, strength, balance, coordination       Patient would benefit from: skilled physical therapy  Planned modality interventions: cryotherapy and thermotherapy: hydrocollator packs  Planned therapy interventions: manual therapy, neuromuscular re-education, self care, therapeutic activities, therapeutic exercise and home exercise program  Frequency: 2x week  Duration in weeks: 6  Plan of Care beginning date: 2023  Plan of Care expiration date: 2024  Treatment plan discussed with: patient      Subjective Evaluation    History of Present Illness  Mechanism of injury: 41year old with acute low back pain. He was lifting boxes of tile on  and felt a pop in his back and ended up on the floor. When looking back he knows he lifted incorrectly. Saw urgent care next day who gave injection, muscle relaxer, and prednisone. Since then pain is less but feels very stiff and that has to be in upright position. Denies any previous back issues. He is unable to work due to back pain. He is required to lift up to 75# at work and it is a physical job. Also having left shoulder pain (hx of RTC repair ) but wants to take care of back first.     Denies any pain into legs, denies bowel/bladder issues, saddle anesthesia, fevers.    Patient Goals  Patient goals for therapy: decreased pain, improved balance, increased motion, increased strength, independence with ADLs/IADLs, return to work and return to sport/leisure activities    Pain  At best pain ratin  At worst pain ratin        Objective  Observation/Gait  Stiff gait and transfers      Multi-seg movement patterns  Flex: to knee with discomfort  Ext: scap to heel - with discomfort  SB: distal third   ROT: L 40 deg, no dis-association , R 25 deg, no dis-association      Lumbar  Good mobility upper lumbar, tone and stiffness L4-5     Discomfort L4-5    Prone press-up: full mobility, slight discomfort end-range      Tone along b/l lumbar paraspinals, no tenderness through glut/ql    Hip screen  Good mobility in all planes, pain free    SLR: 65 deg (B) passive, no pain      Strength/myotome  Slight discomfort with R knee flex/ext  but good strength (4+/5)  5/5 ankle in all planes  4+/5 great toe (B)    Heel walk/toe walk (-) for 10'    DTR's 1+ L4 (B), 2+ S1 (B)    Light touch sensation: symmetrical through dermatomes                   Precautions: acute LBP    Daily Treatment Diary:      Initial Evaluation Date: 11/28/23  POC Expiration: 1/9/24  Compliance 11/28/23                     Visit Number 1                    Re-Eval  IE                 [facility]   Foto Captured Y                        Manuals 11/28       Manual traction        Joint work        flexibility        ST        Neuro Re-Ed        [employer]        birddog        bridge                                Ther Ex        NuStep/TM        clamshells        Hip ext        Leg press        Ham curls        Knee ext        Ankle strength - band        Standing HR        Lumbar ext        Lumbar flex        S/l thoracic open book X5 ea with breathing focus       SKTC 8xea       LTR x12       Prone ext x8       Child pose x10       education Spent time discussing importance of movement and staying active with pt, avoiding repetitive flexion/heavy lifting at this time       Ther Activity                        Gait Training                        Modalities                            Access Code: [email or web address]  URL: [email or web address]  Date: 11/28/2023  Prepared by: Emilie Tobin    Exercises  - Prone Press Up  - 1 x daily - 7 x weekly - 3 sets - 10 reps  - Child's Pose Stretch  - 1 x daily - 7 x weekly - 3 sets - 10 reps  - Sidelying Open Book Thoracic Rotation with Knee on Foam Roll  - 1 x daily - 7 x weekly - 3 sets - 10 reps  - Supine Lower Trunk Rotation  - 1 x daily - 7 x weekly - 3 sets - 10 reps  - Supine Single Knee to Chest Stretch  - 1 x daily - 7 x weekly - 3 sets - 10 reps

## 2023-11-30 ENCOUNTER — OFFICE VISIT (OUTPATIENT)
Dept: PHYSICAL THERAPY | Facility: CLINIC | Age: 38
End: 2023-11-30

## 2023-11-30 DIAGNOSIS — M54.50 ACUTE MIDLINE LOW BACK PAIN WITHOUT SCIATICA: Primary | ICD-10-CM

## 2023-11-30 DIAGNOSIS — S46.912A STRAIN OF LEFT SHOULDER, INITIAL ENCOUNTER: ICD-10-CM

## 2023-11-30 PROCEDURE — 97140 MANUAL THERAPY 1/> REGIONS: CPT | Performed by: PHYSICAL THERAPIST

## 2023-11-30 NOTE — PROGRESS NOTES
Daily Note     Today's date: 2023  Patient name: Ling Sánchez  : 1985  MRN: [id number]  Referring provider: Theodore Bello MD  Dx:   Encounter Diagnosis     ICD-10-CM    1. Acute midline low back pain without sciatica  M54.50       2. Strain of left shoulder, initial encounter  A81.614Z                      Subjective: "definitely feeling better" Not afraid to move anymore      Objective: multi-seg flex: findgers to mid tib with some pulling mid lumbar and paraspinals L,  ext scap just past heels minimal pain, SB to knee no pain, R ROT cordoba slightly      Assessment: pt has shown improvement but remains stiff with flexion. He fits mobility category and incorporated lumbar manual work today. Responded well with manual therapy as evidenced by improved motion (fingers to ankles with no pain). Initiated low level trunk work. Tolerated treatment well. Patient would benefit from continued PT      Plan: Continue per plan of care.   Add standing trunk work, initiate lifting mechanics work     Precautions: acute LBP    Daily Treatment Diary:      Initial Evaluation Date: 23  POC Expiration: 24  Compliance 23                   Visit Number 1 2                   Re-Eval  IE                 [address]   Foto Captured Y                        Manuals       Manual traction        Joint work  Lumbar roll gr 4-5 with education (pt gave consent when questioned)      flexibility        ST  Theragun (B) lumbar paraspinals      Neuro Re-Ed        TA        TA+march        birddog                                        Ther Ex        NuStep/TM        clamshells        Hip ext        Leg press        Ham curls        Knee ext        Ankle strength - band        Standing HR        Prone swimmer  X12 ea      bridge  x20      Hand heel rock  x15      Bird dog  X20 ea              S/l thoracic open book X5 ea with breathing focus       SKTC 8xea       LTR x12       Prone ext x8       Child pose x10       education Spent time discussing importance of movement and staying active with pt, avoiding repetitive flexion/heavy lifting at this time Updated HEP      Ther Activity                        Gait Training                        Modalities                            Access Code: [email or web address]  URL: [email or web address]  Date: 11/28/2023  Prepared by: Irma Greenberg    Exercises  - Prone Press Up  - 1 x daily - 7 x weekly - 3 sets - 10 reps  - Child's Pose Stretch  - 1 x daily - 7 x weekly - 3 sets - 10 reps  - Sidelying Open Book Thoracic Rotation with Knee on Foam Roll  - 1 x daily - 7 x weekly - 3 sets - 10 reps  - Supine Lower Trunk Rotation  - 1 x daily - 7 x weekly - 3 sets - 10 reps  - Supine Single Knee to Chest Stretch  - 1 x daily - 7 x weekly - 3 sets - 10 reps    Access Code: [email or web address]  URL: [email or web address]  Date: 11/30/2023  Prepared by: Irma Greenberg    Exercises  - Supine Bridge  - 1 x daily - 7 x weekly - 3 sets - 10 reps  - Bird Dog  - 1 x daily - 7 x weekly - 3 sets - 10 reps  - Prone Alternating Arm and Leg Lifts  - 1 x daily - 7 x weekly - 3 sets - 10 reps

## 2023-12-05 ENCOUNTER — TELEPHONE (OUTPATIENT)
Dept: FAMILY MEDICINE CLINIC | Facility: CLINIC | Age: 38
End: 2023-12-05

## 2023-12-05 ENCOUNTER — APPOINTMENT (OUTPATIENT)
Dept: PHYSICAL THERAPY | Facility: CLINIC | Age: 38
End: 2023-12-05

## 2023-12-05 NOTE — TELEPHONE ENCOUNTER
Patient called asking if his FMLA paperwork was completed.  He is also wondering if you spoke to PT regarding his treatment    Please advise

## 2023-12-07 ENCOUNTER — OFFICE VISIT (OUTPATIENT)
Dept: PHYSICAL THERAPY | Facility: CLINIC | Age: 38
End: 2023-12-07

## 2023-12-07 DIAGNOSIS — M54.50 ACUTE MIDLINE LOW BACK PAIN WITHOUT SCIATICA: Primary | ICD-10-CM

## 2023-12-07 DIAGNOSIS — S46.912A STRAIN OF LEFT SHOULDER, INITIAL ENCOUNTER: ICD-10-CM

## 2023-12-07 PROCEDURE — 97140 MANUAL THERAPY 1/> REGIONS: CPT | Performed by: PHYSICAL THERAPIST

## 2023-12-07 NOTE — PROGRESS NOTES
Daily Note and Discharge Summary    Today's date: 2023  Patient name: Giulia Lopez  : 1985  MRN: [id number]  Referring provider: Karen Mendoza MD  Dx:   Encounter Diagnosis     ICD-10-CM    1. Acute midline low back pain without sciatica  M54.50       2. Strain of left shoulder, initial encounter  [id number]                      Subjective: feeling pretty good. A little tender but has returned to work and doing a lot of walking. Not doing much lifting at work. Objective: no pain with multi-seg movement patterns. Assessment: pt responded very well to therapy efforts. Back to work. Reviewed proper lifting techniques for back health. Encouraged consistency with HEP and being active/exercise. He feels ready for DC and this is appropriate. T    Plan: discharge     Precautions: acute LBP    Daily Treatment Diary:      Initial Evaluation Date: 23  POC Expiration: 24  Compliance 23                 Visit Number 1 2  3                 Re-Eval  IE                 [facility]   Foto Captured Y                        Manuals      Manual traction        Joint work  Lumbar roll gr 4-5 with education (pt gave consent when questioned)      flexibility        ST  Theragun (B) lumbar paraspinals      Neuro Re-Ed        TA        TA+march        birddog                                        Ther Ex        NuStep/TM        clamshells        Hip ext        Leg press        Ham curls        Knee ext        Ankle strength - band        Standing HR        Prone swimmer  X12 ea      bridge  x20 x20     Hand heel rock  x15 X15      Bird dog  X20 ea X20 ea     Dead lift/hip hinge review for lifting   2x6 10# with education     S/l thoracic open book X5 ea with breathing focus       SKTC 8xea       LTR x12       Prone ext x8       Child pose x10       education Spent time discussing importance of movement and staying active with pt, avoiding repetitive flexion/heavy lifting at this time Updated HEP Encouraged consistent HEP, exercise/active lifestyle     Ther Activity                        Gait Training                        Modalities                            Access Code: [email or web address]  URL: [email or web address]  Date: 11/28/2023  Prepared by: Lizbeth Reese    Exercises  - Prone Press Up  - 1 x daily - 7 x weekly - 3 sets - 10 reps  - Child's Pose Stretch  - 1 x daily - 7 x weekly - 3 sets - 10 reps  - Sidelying Open Book Thoracic Rotation with Knee on Foam Roll  - 1 x daily - 7 x weekly - 3 sets - 10 reps  - Supine Lower Trunk Rotation  - 1 x daily - 7 x weekly - 3 sets - 10 reps  - Supine Single Knee to Chest Stretch  - 1 x daily - 7 x weekly - 3 sets - 10 reps    Access Code: [email or web address]  URL: [email or web address]  Date: 11/30/2023  Prepared by: Lizbeth Reese    Exercises  - Supine Bridge  - 1 x daily - 7 x weekly - 3 sets - 10 reps  - Bird Dog  - 1 x daily - 7 x weekly - 3 sets - 10 reps  - Prone Alternating Arm and Leg Lifts  - 1 x daily - 7 x weekly - 3 sets - 10 reps

## 2023-12-12 ENCOUNTER — APPOINTMENT (OUTPATIENT)
Dept: PHYSICAL THERAPY | Facility: CLINIC | Age: 38
End: 2023-12-12

## 2023-12-14 ENCOUNTER — APPOINTMENT (OUTPATIENT)
Dept: PHYSICAL THERAPY | Facility: CLINIC | Age: 38
End: 2023-12-14

## 2024-05-13 ENCOUNTER — OFFICE VISIT (OUTPATIENT)
Dept: URGENT CARE | Facility: CLINIC | Age: 39
End: 2024-05-13
Payer: COMMERCIAL

## 2024-05-13 VITALS
RESPIRATION RATE: 16 BRPM | SYSTOLIC BLOOD PRESSURE: 132 MMHG | HEIGHT: 71 IN | HEART RATE: 60 BPM | TEMPERATURE: 97.5 F | DIASTOLIC BLOOD PRESSURE: 80 MMHG | OXYGEN SATURATION: 99 % | BODY MASS INDEX: 24.92 KG/M2 | WEIGHT: 178 LBS

## 2024-05-13 DIAGNOSIS — T14.8XXA PUNCTURE WOUND: Primary | ICD-10-CM

## 2024-05-13 DIAGNOSIS — L03.113 CELLULITIS OF RIGHT UPPER EXTREMITY: ICD-10-CM

## 2024-05-13 PROCEDURE — 99213 OFFICE O/P EST LOW 20 MIN: CPT | Performed by: PHYSICIAN ASSISTANT

## 2024-05-13 RX ORDER — CLINDAMYCIN HYDROCHLORIDE 300 MG/1
300 CAPSULE ORAL 4 TIMES DAILY
Qty: 40 CAPSULE | Refills: 0 | Status: SHIPPED | OUTPATIENT
Start: 2024-05-13 | End: 2024-05-23

## 2024-05-13 NOTE — PROGRESS NOTES
"[facility]        NAME: Ramiro Greene is a 38 y.o. male  : 1985    MRN: [id number]  DATE: May 13, 2024  TIME: 11:35 AM    /80   Pulse 60   Temp 97.5 °F (36.4 °C)   Resp 16   Ht 5' 11\" (1.803 m)   Wt 80.7 kg (178 lb)   SpO2 99%   BMI 24.83 kg/m²     Assessment and Plan   No primary diagnosis found.  No diagnosis found.      Patient Instructions       Follow up with PCP in 3-5 days.  Proceed to  ER if symptoms worsen.    Chief Complaint     Chief Complaint   Patient presents with    Rash     Wound on right arm for about 1 month.          History of Present Illness       Pt with right forearm metal puncture wound right forearm, pt still with d/c and redness     Rash        Review of Systems   Review of Systems   Skin:  Positive for rash.         Current Medications       Current Outpatient Medications:     cyclobenzaprine (FLEXERIL) 10 mg tablet, Take 1 tablet (10 mg total) by mouth 3 (three) times a day as needed for muscle spasms (Patient not taking: Reported on 2024), Disp: 45 tablet, Rfl: 0    ibuprofen (MOTRIN) 400 mg tablet, Take by mouth every 6 (six) hours as needed for mild pain (Patient not taking: Reported on 2024), Disp: , Rfl:     Current Allergies     Allergies as of 2024 - Reviewed 2024   Allergen Reaction Noted    Amoxicillin Hives 2017    Penicillins Hives 2017            The following portions of the patient's history were reviewed and updated as appropriate: allergies, current medications, past family history, past medical history, past social history, past surgical history and problem list.     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 He had been using drugs at the time of the seizure. He hasn't had a seizure since .       Past Surgical History:   Procedure Laterality Date    APPENDECTOMY      ROTATOR CUFF REPAIR   " "      Family History   Problem Relation Age of Onset    Lung cancer Father     Depression Brother          Medications have been verified.        Objective   /80   Pulse 60   Temp 97.5 °F (36.4 °C)   Resp 16   Ht 5' 11\" (1.803 m)   Wt 80.7 kg (178 lb)   SpO2 99%   BMI 24.83 kg/m²        Physical Exam     Physical Exam  Vitals and nursing note reviewed.   Constitutional:       Appearance: Normal appearance. He is normal weight.      Comments: Pt declines xray   Discussed with pt if not resolved with antibiotics will need to recheck with ortho and have xray or ultrasound for possibl foreign object eval    HENT:      Head: Normocephalic and atraumatic.   Cardiovascular:      Rate and Rhythm: Normal rate and regular rhythm.      Pulses: Normal pulses.      Heart sounds: Normal heart sounds.   Pulmonary:      Effort: Pulmonary effort is normal.      Breath sounds: Normal breath sounds.   Abdominal:      Palpations: Abdomen is soft.   Musculoskeletal:      Comments: Right forearm  pea size nodule with erythema and slight d/c    Pt declines XRAYS    Neurological:      Mental Status: He is alert and oriented to person, place, and time.                     "

## 2025-04-28 ENCOUNTER — TELEPHONE (OUTPATIENT)
Dept: FAMILY MEDICINE CLINIC | Facility: CLINIC | Age: 40
End: 2025-04-28

## 2025-04-28 NOTE — TELEPHONE ENCOUNTER
Lmom inquiring if pt is wishing to switch to another provider in our practice due to Dr. Jacob leaving or is the pt going elsewhere for care?

## 2025-08-14 ENCOUNTER — OFFICE VISIT (OUTPATIENT)
Dept: URGENT CARE | Age: 40
End: 2025-08-14
Payer: COMMERCIAL